# Patient Record
Sex: FEMALE | Race: BLACK OR AFRICAN AMERICAN | Employment: UNEMPLOYED | URBAN - METROPOLITAN AREA
[De-identification: names, ages, dates, MRNs, and addresses within clinical notes are randomized per-mention and may not be internally consistent; named-entity substitution may affect disease eponyms.]

---

## 2018-08-30 ASSESSMENT — PAIN DESCRIPTION - PAIN TYPE: TYPE: CHRONIC PAIN

## 2018-08-30 ASSESSMENT — PAIN DESCRIPTION - DESCRIPTORS: DESCRIPTORS: CONSTANT

## 2018-08-30 ASSESSMENT — PAIN SCALES - GENERAL: PAINLEVEL_OUTOF10: 10

## 2018-08-30 ASSESSMENT — PAIN DESCRIPTION - LOCATION: LOCATION: BACK

## 2018-08-31 ENCOUNTER — APPOINTMENT (OUTPATIENT)
Dept: GENERAL RADIOLOGY | Age: 63
End: 2018-08-31
Payer: COMMERCIAL

## 2018-08-31 ENCOUNTER — HOSPITAL ENCOUNTER (EMERGENCY)
Age: 63
Discharge: HOME | End: 2018-08-31
Attending: EMERGENCY MEDICINE
Payer: COMMERCIAL

## 2018-08-31 VITALS
WEIGHT: 220 LBS | SYSTOLIC BLOOD PRESSURE: 154 MMHG | OXYGEN SATURATION: 98 % | BODY MASS INDEX: 34.53 KG/M2 | DIASTOLIC BLOOD PRESSURE: 81 MMHG | HEART RATE: 74 BPM | TEMPERATURE: 98.6 F | HEIGHT: 67 IN | RESPIRATION RATE: 18 BRPM

## 2018-08-31 DIAGNOSIS — K59.00 CONSTIPATION, UNSPECIFIED CONSTIPATION TYPE: Primary | ICD-10-CM

## 2018-08-31 LAB
ALBUMIN SERPL-MCNC: 4.1 G/DL (ref 3.5–5.2)
ALP BLD-CCNC: 65 U/L (ref 35–104)
ALT SERPL-CCNC: 15 U/L (ref 0–32)
ANION GAP SERPL CALCULATED.3IONS-SCNC: 11 MMOL/L (ref 7–16)
AST SERPL-CCNC: 17 U/L (ref 0–31)
BASOPHILS ABSOLUTE: 0.04 E9/L (ref 0–0.2)
BASOPHILS RELATIVE PERCENT: 0.5 % (ref 0–2)
BILIRUB SERPL-MCNC: 0.4 MG/DL (ref 0–1.2)
BILIRUBIN URINE: NEGATIVE
BLOOD, URINE: NEGATIVE
BUN BLDV-MCNC: 9 MG/DL (ref 8–23)
CALCIUM SERPL-MCNC: 9.7 MG/DL (ref 8.6–10.2)
CHLORIDE BLD-SCNC: 103 MMOL/L (ref 98–107)
CLARITY: CLEAR
CO2: 27 MMOL/L (ref 22–29)
COLOR: YELLOW
CREAT SERPL-MCNC: 0.7 MG/DL (ref 0.5–1)
EOSINOPHILS ABSOLUTE: 0.4 E9/L (ref 0.05–0.5)
EOSINOPHILS RELATIVE PERCENT: 5.3 % (ref 0–6)
GFR AFRICAN AMERICAN: >60
GFR NON-AFRICAN AMERICAN: >60 ML/MIN/1.73
GLUCOSE BLD-MCNC: 82 MG/DL (ref 74–109)
GLUCOSE URINE: NEGATIVE MG/DL
HCT VFR BLD CALC: 34.5 % (ref 34–48)
HEMOGLOBIN: 11.1 G/DL (ref 11.5–15.5)
IMMATURE GRANULOCYTES #: 0.01 E9/L
IMMATURE GRANULOCYTES %: 0.1 % (ref 0–5)
KETONES, URINE: NEGATIVE MG/DL
LACTIC ACID: 1 MMOL/L (ref 0.5–2.2)
LEUKOCYTE ESTERASE, URINE: NEGATIVE
LIPASE: 28 U/L (ref 13–60)
LYMPHOCYTES ABSOLUTE: 2.34 E9/L (ref 1.5–4)
LYMPHOCYTES RELATIVE PERCENT: 31.2 % (ref 20–42)
MCH RBC QN AUTO: 28.5 PG (ref 26–35)
MCHC RBC AUTO-ENTMCNC: 32.2 % (ref 32–34.5)
MCV RBC AUTO: 88.5 FL (ref 80–99.9)
MONOCYTES ABSOLUTE: 0.61 E9/L (ref 0.1–0.95)
MONOCYTES RELATIVE PERCENT: 8.1 % (ref 2–12)
NEUTROPHILS ABSOLUTE: 4.09 E9/L (ref 1.8–7.3)
NEUTROPHILS RELATIVE PERCENT: 54.8 % (ref 43–80)
NITRITE, URINE: NEGATIVE
PDW BLD-RTO: 14.4 FL (ref 11.5–15)
PH UA: 6 (ref 5–9)
PLATELET # BLD: 231 E9/L (ref 130–450)
PMV BLD AUTO: 9.9 FL (ref 7–12)
POTASSIUM SERPL-SCNC: 3.5 MMOL/L (ref 3.5–5)
PROTEIN UA: NEGATIVE MG/DL
RBC # BLD: 3.9 E12/L (ref 3.5–5.5)
SODIUM BLD-SCNC: 141 MMOL/L (ref 132–146)
SPECIFIC GRAVITY UA: 1.01 (ref 1–1.03)
TOTAL PROTEIN: 7.4 G/DL (ref 6.4–8.3)
UROBILINOGEN, URINE: 0.2 E.U./DL
WBC # BLD: 7.5 E9/L (ref 4.5–11.5)

## 2018-08-31 PROCEDURE — 85025 COMPLETE CBC W/AUTO DIFF WBC: CPT

## 2018-08-31 PROCEDURE — 99284 EMERGENCY DEPT VISIT MOD MDM: CPT

## 2018-08-31 PROCEDURE — 96374 THER/PROPH/DIAG INJ IV PUSH: CPT

## 2018-08-31 PROCEDURE — 2580000003 HC RX 258: Performed by: EMERGENCY MEDICINE

## 2018-08-31 PROCEDURE — 81003 URINALYSIS AUTO W/O SCOPE: CPT

## 2018-08-31 PROCEDURE — 80053 COMPREHEN METABOLIC PANEL: CPT

## 2018-08-31 PROCEDURE — 36415 COLL VENOUS BLD VENIPUNCTURE: CPT

## 2018-08-31 PROCEDURE — 83690 ASSAY OF LIPASE: CPT

## 2018-08-31 PROCEDURE — 74018 RADEX ABDOMEN 1 VIEW: CPT

## 2018-08-31 PROCEDURE — 83605 ASSAY OF LACTIC ACID: CPT

## 2018-08-31 PROCEDURE — 6360000002 HC RX W HCPCS: Performed by: EMERGENCY MEDICINE

## 2018-08-31 PROCEDURE — 96375 TX/PRO/DX INJ NEW DRUG ADDON: CPT

## 2018-08-31 RX ORDER — 0.9 % SODIUM CHLORIDE 0.9 %
500 INTRAVENOUS SOLUTION INTRAVENOUS ONCE
Status: COMPLETED | OUTPATIENT
Start: 2018-08-31 | End: 2018-08-31

## 2018-08-31 RX ORDER — POLYETHYLENE GLYCOL 3350 17 G/17G
17 POWDER ORAL 2 TIMES DAILY
Qty: 1 BOTTLE | Refills: 1 | Status: SHIPPED | OUTPATIENT
Start: 2018-08-31 | End: 2018-11-08

## 2018-08-31 RX ORDER — ONDANSETRON 2 MG/ML
4 INJECTION INTRAMUSCULAR; INTRAVENOUS ONCE
Status: COMPLETED | OUTPATIENT
Start: 2018-08-31 | End: 2018-08-31

## 2018-08-31 RX ORDER — KETOROLAC TROMETHAMINE 30 MG/ML
15 INJECTION, SOLUTION INTRAMUSCULAR; INTRAVENOUS ONCE
Status: COMPLETED | OUTPATIENT
Start: 2018-08-31 | End: 2018-08-31

## 2018-08-31 RX ADMIN — KETOROLAC TROMETHAMINE 15 MG: 30 INJECTION INTRAMUSCULAR; INTRAVENOUS at 02:44

## 2018-08-31 RX ADMIN — ONDANSETRON 4 MG: 2 INJECTION INTRAMUSCULAR; INTRAVENOUS at 02:43

## 2018-08-31 RX ADMIN — SODIUM CHLORIDE 500 ML: 9 INJECTION, SOLUTION INTRAVENOUS at 02:38

## 2018-08-31 ASSESSMENT — PAIN SCALES - GENERAL
PAINLEVEL_OUTOF10: 3
PAINLEVEL_OUTOF10: 10

## 2018-08-31 NOTE — ED PROVIDER NOTES
Department of Emergency Medicine   ED  Provider Note  Admit Date/RoomTime: 8/31/2018  1:00 AM  ED Room: 09/09      History of Present Illness:  8/31/18, Time: 2:01 AM         Kaley Pulido is a 58 y.o. female presenting to the ED for back pain, beginning one day ago. The complaint has been constant, moderate in severity, and worsened by walking. Pt states that she has back pain that wraps around to the right side of her ribs. Pt states that she has been constipated. Pt reports that her last BM was 5 days ago. Pt denies cough, congestion, fever, chills, N/V/D, HA, CP, SOB, abdominal pain, neck pain, LOC, syncope, urinary symptoms, or any other symptoms. Review of Systems:   Pertinent positives and negatives are stated within HPI, all other systems reviewed and are negative.    --------------------------------------------- PAST HISTORY ---------------------------------------------  Past Medical History:  has no past medical history on file. Past Surgical History:  has a past surgical history that includes back surgery. Social History:  reports that she has quit smoking. She has never used smokeless tobacco. She reports that she does not drink alcohol or use drugs. Family History: family history is not on file. The patients home medications have been reviewed. Allergies: Vicodin [hydrocodone-acetaminophen]      ---------------------------------------------------PHYSICAL EXAM--------------------------------------    Constitutional/General: Alert and oriented x3, well appearing, non toxic in NAD  Head: Normocephalic and atraumatic  Eyes: PERRL, EOMI. Mouth: Oropharynx clear, mucous membranes moist.   Neck: Supple. Full ROM. Respiratory: Lungs clear to auscultation bilaterally, no wheezes, rales, or rhonchi. Not in respiratory distress   Cardiovascular:  Regular rate. Regular rhythm. No murmurs, gallops, or rubs.  2+ distal pulses  GI:  Abdomen Soft, right flank tenderness, no

## 2018-11-08 ENCOUNTER — HOSPITAL ENCOUNTER (EMERGENCY)
Age: 63
Discharge: HOME | End: 2018-11-08
Attending: EMERGENCY MEDICINE
Payer: COMMERCIAL

## 2018-11-08 ENCOUNTER — APPOINTMENT (OUTPATIENT)
Dept: CT IMAGING | Age: 63
End: 2018-11-08
Payer: COMMERCIAL

## 2018-11-08 VITALS
DIASTOLIC BLOOD PRESSURE: 81 MMHG | RESPIRATION RATE: 18 BRPM | WEIGHT: 200 LBS | OXYGEN SATURATION: 99 % | TEMPERATURE: 98.5 F | SYSTOLIC BLOOD PRESSURE: 151 MMHG | HEIGHT: 67 IN | BODY MASS INDEX: 31.39 KG/M2 | HEART RATE: 66 BPM

## 2018-11-08 DIAGNOSIS — R51.9 ACUTE NONINTRACTABLE HEADACHE, UNSPECIFIED HEADACHE TYPE: Primary | ICD-10-CM

## 2018-11-08 DIAGNOSIS — R42 DIZZINESS: ICD-10-CM

## 2018-11-08 LAB
EKG ATRIAL RATE: 63 BPM
EKG P AXIS: 31 DEGREES
EKG P-R INTERVAL: 188 MS
EKG Q-T INTERVAL: 380 MS
EKG QRS DURATION: 72 MS
EKG QTC CALCULATION (BAZETT): 388 MS
EKG R AXIS: 51 DEGREES
EKG T AXIS: -12 DEGREES
EKG VENTRICULAR RATE: 63 BPM

## 2018-11-08 PROCEDURE — 99284 EMERGENCY DEPT VISIT MOD MDM: CPT

## 2018-11-08 PROCEDURE — 6370000000 HC RX 637 (ALT 250 FOR IP): Performed by: PHYSICIAN ASSISTANT

## 2018-11-08 PROCEDURE — 70450 CT HEAD/BRAIN W/O DYE: CPT

## 2018-11-08 RX ORDER — IBUPROFEN 600 MG/1
600 TABLET ORAL ONCE
Status: DISCONTINUED | OUTPATIENT
Start: 2018-11-08 | End: 2018-11-08 | Stop reason: HOSPADM

## 2018-11-08 RX ORDER — ACETAMINOPHEN 325 MG/1
650 TABLET ORAL ONCE
Status: COMPLETED | OUTPATIENT
Start: 2018-11-08 | End: 2018-11-08

## 2018-11-08 RX ADMIN — ACETAMINOPHEN 650 MG: 325 TABLET ORAL at 13:21

## 2018-11-08 ASSESSMENT — PAIN SCALES - GENERAL
PAINLEVEL_OUTOF10: 9
PAINLEVEL_OUTOF10: 6

## 2018-11-08 ASSESSMENT — PAIN DESCRIPTION - ORIENTATION: ORIENTATION: RIGHT

## 2018-11-08 ASSESSMENT — PAIN DESCRIPTION - LOCATION: LOCATION: HEAD

## 2018-11-08 ASSESSMENT — PAIN DESCRIPTION - PAIN TYPE: TYPE: ACUTE PAIN

## 2018-11-08 NOTE — ED PROVIDER NOTES
ED Attending  CC: Kimberley       Department of Emergency Medicine   ED  Provider Note  Admit Date/RoomTime: 11/8/2018 12:11 PM  ED Room: [facility]/[place] [address]   Chief Complaint:   Headache (reports woke up not feeling well with headache and dizzyness) and Dizziness    History of Present Illness   Source of history provided by:  patient. History/Exam Limitations: none. Nba Jean Baptiste is a 58 y.o. old female who has a past medical hx of: History reviewed. No pertinent past medical history. presents to the emergency department by private vehicle, for complaint of gradual onset right-sided head pain which began this morning shortly after waking up prior. Patient states she got out of bed and when she went to stand up she felt lightheaded, \"like something was about to happen\". She states she walked to the bathroom and she still was feeling lightheaded. She states that the lightheadedness lasted about 20 minutes and now has subsided. Shortly after, she started with a dull right-sided headache located at the top of her head and behind her right orbit. She states \"it's not really a headache, just a pain\". Since onset the symptoms have been getting better and mild in severity. She denies recent illness, sinus congestion, chest pain, SOB, fevers, chills. Pt states she does not see a PCP and states she takes her BP at home and it usually runs around \"the 120/80 range\". She does not take anything for HTN. The patient has history of nothing pertinent. Today's episode is not typical for her. She has had no prior workup in the past. The pain is associated with nothing else and negative for numbness, weakness, speech or visual changes. The symptoms are aggravated by nothing and relieved by putting pressure on the head. There has been no history of recent traumA. Treatment prior to arrival consisted of: nothing. She has a history of no anticoagulation use.     ROS    Pertinent positives and negatives are stated within HPI, all

## 2019-02-28 ENCOUNTER — HOSPITAL ENCOUNTER (EMERGENCY)
Age: 64
Discharge: HOME | End: 2019-02-28
Attending: EMERGENCY MEDICINE
Payer: COMMERCIAL

## 2019-02-28 ENCOUNTER — APPOINTMENT (OUTPATIENT)
Dept: GENERAL RADIOLOGY | Age: 64
End: 2019-02-28
Payer: COMMERCIAL

## 2019-02-28 VITALS
OXYGEN SATURATION: 100 % | HEIGHT: 67 IN | WEIGHT: 200 LBS | DIASTOLIC BLOOD PRESSURE: 88 MMHG | TEMPERATURE: 98 F | BODY MASS INDEX: 31.39 KG/M2 | SYSTOLIC BLOOD PRESSURE: 148 MMHG | HEART RATE: 94 BPM | RESPIRATION RATE: 16 BRPM

## 2019-02-28 DIAGNOSIS — R05.9 COUGH: Primary | ICD-10-CM

## 2019-02-28 DIAGNOSIS — J98.01 BRONCHOSPASM: ICD-10-CM

## 2019-02-28 LAB
ALBUMIN SERPL-MCNC: 4.4 G/DL (ref 3.5–5.2)
ALP BLD-CCNC: 82 U/L (ref 35–104)
ALT SERPL-CCNC: 15 U/L (ref 0–32)
ANION GAP SERPL CALCULATED.3IONS-SCNC: 7 MMOL/L (ref 7–16)
AST SERPL-CCNC: 19 U/L (ref 0–31)
BASOPHILS ABSOLUTE: 0.05 E9/L (ref 0–0.2)
BASOPHILS RELATIVE PERCENT: 0.7 % (ref 0–2)
BILIRUB SERPL-MCNC: <0.2 MG/DL (ref 0–1.2)
BUN BLDV-MCNC: 14 MG/DL (ref 8–23)
CALCIUM SERPL-MCNC: 9.6 MG/DL (ref 8.6–10.2)
CHLORIDE BLD-SCNC: 98 MMOL/L (ref 98–107)
CO2: 29 MMOL/L (ref 22–29)
CREAT SERPL-MCNC: 0.9 MG/DL (ref 0.5–1)
D DIMER: <200 NG/ML DDU
EOSINOPHILS ABSOLUTE: 0.44 E9/L (ref 0.05–0.5)
EOSINOPHILS RELATIVE PERCENT: 6.2 % (ref 0–6)
GFR AFRICAN AMERICAN: >60
GFR NON-AFRICAN AMERICAN: >60 ML/MIN/1.73
GLUCOSE BLD-MCNC: 143 MG/DL (ref 74–99)
HCT VFR BLD CALC: 37.5 % (ref 34–48)
HEMOGLOBIN: 12.2 G/DL (ref 11.5–15.5)
IMMATURE GRANULOCYTES #: 0.02 E9/L
IMMATURE GRANULOCYTES %: 0.3 % (ref 0–5)
LYMPHOCYTES ABSOLUTE: 1.95 E9/L (ref 1.5–4)
LYMPHOCYTES RELATIVE PERCENT: 27.5 % (ref 20–42)
MCH RBC QN AUTO: 28.7 PG (ref 26–35)
MCHC RBC AUTO-ENTMCNC: 32.5 % (ref 32–34.5)
MCV RBC AUTO: 88.2 FL (ref 80–99.9)
MONOCYTES ABSOLUTE: 0.53 E9/L (ref 0.1–0.95)
MONOCYTES RELATIVE PERCENT: 7.5 % (ref 2–12)
NEUTROPHILS ABSOLUTE: 4.1 E9/L (ref 1.8–7.3)
NEUTROPHILS RELATIVE PERCENT: 57.8 % (ref 43–80)
PDW BLD-RTO: 14.5 FL (ref 11.5–15)
PLATELET # BLD: 283 E9/L (ref 130–450)
PMV BLD AUTO: 10.6 FL (ref 7–12)
POTASSIUM SERPL-SCNC: 3.8 MMOL/L (ref 3.5–5)
PRO-BNP: 25 PG/ML (ref 0–125)
RBC # BLD: 4.25 E12/L (ref 3.5–5.5)
SODIUM BLD-SCNC: 134 MMOL/L (ref 132–146)
TOTAL PROTEIN: 7.5 G/DL (ref 6.4–8.3)
TROPONIN: <0.01 NG/ML (ref 0–0.03)
WBC # BLD: 7.1 E9/L (ref 4.5–11.5)

## 2019-02-28 PROCEDURE — 83880 ASSAY OF NATRIURETIC PEPTIDE: CPT

## 2019-02-28 PROCEDURE — 94640 AIRWAY INHALATION TREATMENT: CPT

## 2019-02-28 PROCEDURE — 80053 COMPREHEN METABOLIC PANEL: CPT

## 2019-02-28 PROCEDURE — 36415 COLL VENOUS BLD VENIPUNCTURE: CPT

## 2019-02-28 PROCEDURE — 84484 ASSAY OF TROPONIN QUANT: CPT

## 2019-02-28 PROCEDURE — 94760 N-INVAS EAR/PLS OXIMETRY 1: CPT

## 2019-02-28 PROCEDURE — 99285 EMERGENCY DEPT VISIT HI MDM: CPT

## 2019-02-28 PROCEDURE — 93005 ELECTROCARDIOGRAM TRACING: CPT | Performed by: NURSE PRACTITIONER

## 2019-02-28 PROCEDURE — 85378 FIBRIN DEGRADE SEMIQUANT: CPT

## 2019-02-28 PROCEDURE — 94664 DEMO&/EVAL PT USE INHALER: CPT

## 2019-02-28 PROCEDURE — 6370000000 HC RX 637 (ALT 250 FOR IP): Performed by: EMERGENCY MEDICINE

## 2019-02-28 PROCEDURE — 85025 COMPLETE CBC W/AUTO DIFF WBC: CPT

## 2019-02-28 PROCEDURE — 71045 X-RAY EXAM CHEST 1 VIEW: CPT

## 2019-02-28 RX ORDER — PREDNISONE 20 MG/1
60 TABLET ORAL ONCE
Status: COMPLETED | OUTPATIENT
Start: 2019-02-28 | End: 2019-02-28

## 2019-02-28 RX ORDER — METHYLPREDNISOLONE 4 MG/1
TABLET ORAL
Qty: 21 TABLET | Refills: 0 | Status: SHIPPED | OUTPATIENT
Start: 2019-02-28 | End: 2019-03-06

## 2019-02-28 RX ORDER — ALBUTEROL SULFATE 90 UG/1
2 AEROSOL, METERED RESPIRATORY (INHALATION) EVERY 4 HOURS PRN
Qty: 1 INHALER | Refills: 1 | Status: SHIPPED | OUTPATIENT
Start: 2019-02-28 | End: 2019-05-24 | Stop reason: SDUPTHER

## 2019-02-28 RX ORDER — IPRATROPIUM BROMIDE AND ALBUTEROL SULFATE 2.5; .5 MG/3ML; MG/3ML
1 SOLUTION RESPIRATORY (INHALATION)
Status: COMPLETED | OUTPATIENT
Start: 2019-02-28 | End: 2019-02-28

## 2019-02-28 RX ADMIN — IPRATROPIUM BROMIDE AND ALBUTEROL SULFATE 1 AMPULE: .5; 3 SOLUTION RESPIRATORY (INHALATION) at 20:32

## 2019-02-28 RX ADMIN — IPRATROPIUM BROMIDE AND ALBUTEROL SULFATE 1 AMPULE: .5; 3 SOLUTION RESPIRATORY (INHALATION) at 20:29

## 2019-02-28 RX ADMIN — PREDNISONE 60 MG: 20 TABLET ORAL at 20:19

## 2019-02-28 RX ADMIN — IPRATROPIUM BROMIDE AND ALBUTEROL SULFATE 1 AMPULE: .5; 3 SOLUTION RESPIRATORY (INHALATION) at 20:33

## 2019-03-01 LAB
EKG ATRIAL RATE: 107 BPM
EKG P AXIS: 60 DEGREES
EKG P-R INTERVAL: 188 MS
EKG Q-T INTERVAL: 322 MS
EKG QRS DURATION: 72 MS
EKG QTC CALCULATION (BAZETT): 429 MS
EKG R AXIS: 64 DEGREES
EKG T AXIS: 57 DEGREES
EKG VENTRICULAR RATE: 107 BPM

## 2019-03-11 ENCOUNTER — OFFICE VISIT (OUTPATIENT)
Dept: INTERNAL MEDICINE | Age: 64
End: 2019-03-11
Payer: COMMERCIAL

## 2019-03-11 VITALS
HEIGHT: 67 IN | HEART RATE: 87 BPM | SYSTOLIC BLOOD PRESSURE: 149 MMHG | DIASTOLIC BLOOD PRESSURE: 72 MMHG | RESPIRATION RATE: 16 BRPM | TEMPERATURE: 99.3 F | WEIGHT: 220.1 LBS | BODY MASS INDEX: 34.55 KG/M2

## 2019-03-11 DIAGNOSIS — B34.9 VIRAL ILLNESS: ICD-10-CM

## 2019-03-11 PROCEDURE — 90686 IIV4 VACC NO PRSV 0.5 ML IM: CPT

## 2019-03-11 PROCEDURE — G0008 ADMIN INFLUENZA VIRUS VAC: HCPCS

## 2019-03-11 PROCEDURE — 99212 OFFICE O/P EST SF 10 MIN: CPT | Performed by: INTERNAL MEDICINE

## 2019-03-11 PROCEDURE — 99213 OFFICE O/P EST LOW 20 MIN: CPT | Performed by: INTERNAL MEDICINE

## 2019-03-11 PROCEDURE — 6360000002 HC RX W HCPCS

## 2019-03-11 RX ORDER — ALBUTEROL SULFATE 90 UG/1
2 AEROSOL, METERED RESPIRATORY (INHALATION) EVERY 6 HOURS PRN
Qty: 1 INHALER | Refills: 3 | Status: SHIPPED | OUTPATIENT
Start: 2019-03-11 | End: 2019-07-22 | Stop reason: SDUPTHER

## 2019-03-11 RX ORDER — GUAIFENESIN 600 MG/1
600 TABLET, EXTENDED RELEASE ORAL 2 TIMES DAILY
Qty: 30 TABLET | Refills: 0 | Status: SHIPPED | OUTPATIENT
Start: 2019-03-11 | End: 2019-03-26

## 2019-03-11 RX ORDER — BENZONATATE 100 MG/1
100-200 CAPSULE ORAL 3 TIMES DAILY PRN
Qty: 60 CAPSULE | Refills: 0 | Status: SHIPPED | OUTPATIENT
Start: 2019-03-11 | End: 2019-03-18

## 2019-03-11 ASSESSMENT — PATIENT HEALTH QUESTIONNAIRE - PHQ9
SUM OF ALL RESPONSES TO PHQ QUESTIONS 1-9: 0
2. FEELING DOWN, DEPRESSED OR HOPELESS: 0
1. LITTLE INTEREST OR PLEASURE IN DOING THINGS: 0
SUM OF ALL RESPONSES TO PHQ QUESTIONS 1-9: 0
SUM OF ALL RESPONSES TO PHQ9 QUESTIONS 1 & 2: 0

## 2019-03-11 ASSESSMENT — ENCOUNTER SYMPTOMS
NAUSEA: 0
SINUS PAIN: 0
COUGH: 1
WHEEZING: 0
DIARRHEA: 0
SHORTNESS OF BREATH: 0
ABDOMINAL PAIN: 0
CONSTIPATION: 0
VOMITING: 0

## 2019-05-24 ENCOUNTER — OFFICE VISIT (OUTPATIENT)
Dept: INTERNAL MEDICINE | Age: 64
End: 2019-05-24
Payer: COMMERCIAL

## 2019-05-24 VITALS
WEIGHT: 216 LBS | HEIGHT: 68 IN | BODY MASS INDEX: 32.74 KG/M2 | SYSTOLIC BLOOD PRESSURE: 136 MMHG | RESPIRATION RATE: 16 BRPM | DIASTOLIC BLOOD PRESSURE: 84 MMHG | TEMPERATURE: 98.5 F | HEART RATE: 90 BPM

## 2019-05-24 DIAGNOSIS — R05.3 CHRONIC COUGH: Primary | ICD-10-CM

## 2019-05-24 PROCEDURE — 99213 OFFICE O/P EST LOW 20 MIN: CPT | Performed by: INTERNAL MEDICINE

## 2019-05-24 PROCEDURE — 99212 OFFICE O/P EST SF 10 MIN: CPT | Performed by: INTERNAL MEDICINE

## 2019-05-24 RX ORDER — BUDESONIDE AND FORMOTEROL FUMARATE DIHYDRATE 160; 4.5 UG/1; UG/1
2 AEROSOL RESPIRATORY (INHALATION) 2 TIMES DAILY
Qty: 1 INHALER | Refills: 5 | Status: SHIPPED | OUTPATIENT
Start: 2019-05-24 | End: 2019-07-22 | Stop reason: SDUPTHER

## 2019-05-24 RX ORDER — PANTOPRAZOLE SODIUM 40 MG/1
40 TABLET, DELAYED RELEASE ORAL
Qty: 180 TABLET | Refills: 1 | Status: SHIPPED | OUTPATIENT
Start: 2019-05-24

## 2019-05-24 ASSESSMENT — ENCOUNTER SYMPTOMS
VOMITING: 0
CONSTIPATION: 0
ABDOMINAL PAIN: 0
SHORTNESS OF BREATH: 0
NAUSEA: 0
DIARRHEA: 0
WHEEZING: 0
COUGH: 0
SINUS PAIN: 0

## 2019-05-24 NOTE — PROGRESS NOTES
[facility]  [facility]    Attending Physician Statement:  Lizzeth Gonzalez M.D., F.A.C.P. I have  discussed the case, including pertinent history and exam findings with the resident. I agree with the assessment, plan and orders as documented by the resident. Patient is seen for fu visit today. Last office notes reviewed, relative labs and imaging. Health maintenance issues of vaccinations, depression screening, tobacco cessation etc... covered  Chronic nocturnal cough  No chf symptoms   No PND complaints  -- no hx of asthma/copd-- but we trialed albuterol is helpful but not for cough  (cough variant possible-- vs eosinophilic non allergic bronchitis)  -- will now trial LABA and steroid inhaler- (trial one month) +/- pfts    -- possible gerd PPI bid 2-3months to see helpful  nsaids make cough worse at gerd, GERD vs asthma  - fu in 6 weeks and reassess  Remainder of medical problems as per resident note.

## 2019-05-24 NOTE — PROGRESS NOTES
[facility]  InternalMedicine Residency Program  ACC Note      SUBJECTIVE:  CC: had concerns including Chest Congestion and Cough (clear thick sputum). Segundo Mack presented to the [facility] for a routine visit to discuss     Review of Systems   Constitutional: Negative for chills and fever. HENT: Negative for congestion and sinus pain. Respiratory: Negative for cough, shortness of breath and wheezing. Cardiovascular: Negative for chest pain, palpitations and leg swelling. Gastrointestinal: Negative for abdominal pain, constipation, diarrhea, nausea and vomiting. Genitourinary: Negative for dysuria and frequency. Musculoskeletal: Negative for myalgias. Skin: Negative for rash. Allergic/Immunologic: Negative for environmental allergies. Neurological: Negative for headaches. Hematological: Does not bruise/bleed easily. Psychiatric/Behavioral: Negative for suicidal ideas. Outpatient Medications Marked as Taking for the 5/24/19 encounter (Office Visit) with Myrene Filter., DO   Medication Sig Dispense Refill    budesonide-formoterol (SYMBICORT) 160-4.5 MCG/ACT AERO Inhale 2 puffs into the lungs 2 times daily 1 Inhaler 5    pantoprazole (PROTONIX) 40 MG tablet Take 1 tablet by mouth 2 times daily (before meals) 180 tablet 1       I have reviewed all pertinent PMHx, PSHx, FamHx, SocialHx, medications, and allergiesand updated history as appropriate. OBJECTIVE:    VS: /84 (Site: Right Upper Arm, Position: Sitting, Cuff Size: Large Adult)   Pulse 90   Temp 98.5 °F (36.9 °C) (Oral)   Resp 16   Ht 5' 8\" (1.727 m)   Wt 216 lb (98 kg)   BMI 32.84 kg/m²   Physical Exam   Constitutional: She appears well-developed and well-nourished. HENT:   Head: Normocephalic and atraumatic. Eyes: Pupils are equal, round, and reactive to light. Conjunctivae and EOM are normal. No scleral icterus. Neck: Carotid bruit is not present.    Cardiovascular: Normal rate, regular rhythm, S1 normal, S2 normal and normal heart sounds. PMI is not displaced. No murmur heard. Pulses:       Posterior tibial pulses are 2+ on the right side, and 2+ on the left side. No Edema in BL LE  No JVD; no hepatojugular reflex   Pulmonary/Chest: Effort normal. No respiratory distress. She has no decreased breath sounds. She has no wheezes. She has no rhonchi. She has no rales. Abdominal: Soft. Bowel sounds are normal. She exhibits no mass. There is no tenderness. There is no guarding. Neurological: She is alert. Nursing note and vitals reviewed.       PLAN:    Chronic Cough  -productive; whitish clear sputum; discussed triggers; patient relates that notices that the cough gets worse when there   -PFTs; albuterol, symbicort to rule out cough variant asthma vs ASA/COXI induced asthma   -Protonix trial BID to R/O GERD 2/2 symptoms occurring at night when lying in bed more often   -No physcial examination findings of Heart Failure; if cough does not resolve consider TTE    HCM 50-64 woman  - will discuss HCM at future exam    Health Maintenance Due   Topic Date Due    Hepatitis C screen  1955    HIV screen  11/13/1970    DTaP/Tdap/Td vaccine (1 - Tdap) 11/13/1974    Cervical cancer screen  11/13/1976    Lipid screen  11/13/1995    Diabetes screen  11/13/1995    Breast cancer screen  11/13/2005    Shingles Vaccine (1 of 2) 11/13/2005    Colon cancer screen colonoscopy  11/13/2005       I have reviewed my findings and recommendations with Divina Neff and Dr Mi Fernando, DO PGY-1   5/24/2019 3:07 PM

## 2019-05-24 NOTE — PATIENT INSTRUCTIONS
Patient Education        Preventing Falls: Care Instructions  Your Care Instructions    Getting around your home safely can be a challenge if you have injuries or health problems that make it easy for you to fall. Loose rugs and furniture in walkways are among the dangers for many older people who have problems walking or who have poor eyesight. People who have conditions such as arthritis, osteoporosis, or dementia also have to be careful not to fall. You can make your home safer with a few simple measures. Follow-up care is a key part of your treatment and safety. Be sure to make and go to all appointments, and call your doctor if you are having problems. It's also a good idea to know your test results and keep a list of the medicines you take. How can you care for yourself at home? Taking care of yourself  · You may get dizzy if you do not drink enough water. To prevent dehydration, drink plenty of fluids, enough so that your urine is light yellow or clear like water. Choose water and other caffeine-free clear liquids. If you have kidney, heart, or liver disease and have to limit fluids, talk with your doctor before you increase the amount of fluids you drink. · Exercise regularly to improve your strength, muscle tone, and balance. Walk if you can. Swimming may be a good choice if you cannot walk easily. · Have your vision and hearing checked each year or any time you notice a change. If you have trouble seeing and hearing, you might not be able to avoid objects and could lose your balance. · Know the side effects of the medicines you take. Ask your doctor or pharmacist whether the medicines you take can affect your balance. Sleeping pills or sedatives can affect your balance. · Limit the amount of alcohol you drink. Alcohol can impair your balance and other senses. · Ask your doctor whether calluses or corns on your feet need to be removed.  If you wear loose-fitting shoes because of calluses or corns, you can lose your balance and fall. · Talk to your doctor if you have numbness in your feet. Preventing falls at home  · Remove raised doorway thresholds, throw rugs, and clutter. Repair loose carpet or raised areas in the floor. · Move furniture and electrical cords to keep them out of walking paths. · Use nonskid floor wax, and wipe up spills right away, especially on ceramic tile floors. · If you use a walker or cane, put rubber tips on it. If you use crutches, clean the bottoms of them regularly with an abrasive pad, such as steel wool. · Keep your house well lit, especially Sophia Session, and outside walkways. Use night-lights in areas such as hallways and bathrooms. Add extra light switches or use remote switches (such as switches that go on or off when you clap your hands) to make it easier to turn lights on if you have to get up during the night. · Install sturdy handrails on stairways. · Move items in your cabinets so that the things you use a lot are on the lower shelves (about waist level). · Keep a cordless phone and a flashlight with new batteries by your bed. If possible, put a phone in each of the main rooms of your house, or carry a cell phone in case you fall and cannot reach a phone. Or, you can wear a device around your neck or wrist. You push a button that sends a signal for help. · Wear low-heeled shoes that fit well and give your feet good support. Use footwear with nonskid soles. Check the heels and soles of your shoes for wear. Repair or replace worn heels or soles. · Do not wear socks without shoes on wood floors. · Walk on the grass when the sidewalks are slippery. If you live in an area that gets snow and ice in the winter, sprinkle salt on slippery steps and sidewalks. Preventing falls in the bath  · Install grab bars and nonskid mats inside and outside your shower or tub and near the toilet and sinks. · Use shower chairs and bath benches.   · Use a hand-held shower head to all appointments, and call your doctor if you are having problems. It's also a good idea to know your test results and keep a list of the medicines you take. How can you care for yourself at home? · Drink plenty of water and other fluids. This may help soothe a dry or sore throat. Honey or lemon juice in hot water or tea may ease a dry cough. · Prop up your head on pillows to help you breathe and ease a cough. · Do not smoke or allow others to smoke around you. Smoke can make a cough worse. If you need help quitting, talk to your doctor about stop-smoking programs and medicines. These can increase your chances of quitting for good. · Avoid exposure to smoke, dust, or other pollutants, or wear a face mask. Check with your doctor or pharmacist to find out which type of face mask will give you the most benefit. · Take cough medicine as directed by your doctor. · Try cough drops to soothe a dry or sore throat. Cough drops don't stop a cough. Medicine-flavored cough drops are no better than candy-flavored drops or hard candy. Throat clearing  When you have a chronic cough or a disease that may cause this type of cough, you may often feel like you want to clear your throat. This helps bring up mucus. But throat clearing does not always have a cause. Throat clearing can become a habit. The more you do it, the more you feel like you need to do it. But frequent throat clearing can be hard on your vocal cords. It's like slamming them together. To help lessen throat clearing, you can try:  · Taking small sips of water. · Not clearing your throat when you feel you need to. · Swallowing hard when you want to clear your throat. You may want to ask your doctor if a medicine that thins mucus would help. When should you call for help? Call 911 anytime you think you may need emergency care.  For example, call if:    · You have severe trouble breathing.    Call your doctor now or seek immediate medical care if:    · You cough up blood.     · You have new or worse trouble breathing.     · You have a new or higher fever.    Watch closely for changes in your health, and be sure to contact your doctor if:    · You cough more deeply or more often, especially if you notice more mucus or a change in the color of your mucus.     · You do not get better as expected. Where can you learn more? Go to https://[employer].Pixifly. org and sign in to your [employer] account. Enter E248 in the [employer] box to learn more about \"Chronic Cough: Care Instructions. \"     If you do not have an account, please click on the \"Sign Up Now\" link. Current as of: September 5, 2018  Content Version: 12.0  © 1301-8058 [employer]. Care instructions adapted under license by [facility] ([facility]). If you have questions about a medical condition or this instruction, always ask your healthcare professional. [place] [address] any warranty or liability for your use of this information. Lab Tests to get prior to next Visit  1. Pulmonary Function Testing     Changes in Medications  1. Continue Albuterol as needed for SOB and cough  2. Start Symbicort daily as prescribed   3. Start Protonix twice per day     Referrals   1. None     Please follow up 6 weeks with our clinic. Please call if your symptoms worsen or fail to improve.

## 2019-05-28 ENCOUNTER — TELEPHONE (OUTPATIENT)
Dept: INTERNAL MEDICINE | Age: 64
End: 2019-05-28

## 2019-05-28 NOTE — TELEPHONE ENCOUNTER
Pt called into [facility], states that script that was sent to pharmacy for Symbicort Inhaler is very costly ($400.00and $150.00 with insurance) . Pt states that this is too much for her to pay out of pocket. Pt asked if there is a \"Pill\" form of this medication or something similar that she can take. Informed pt that message will be forwarded to clinical staff. Pt ok'd.      .Electronically signed by Cj Elise on 5/28/19 at 3:21 PM

## 2019-05-29 ENCOUNTER — TELEPHONE (OUTPATIENT)
Dept: INTERNAL MEDICINE | Age: 64
End: 2019-05-29

## 2019-05-29 NOTE — TELEPHONE ENCOUNTER
Spoke with Lindsey Juárez, we will attempt to get the medication covered under medical assistance and re-evaluate her at future appointment. I will follow up on this case during the day. Natalia Boo stated that she would call her.   Thank you!!

## 2019-06-04 ENCOUNTER — TELEPHONE (OUTPATIENT)
Dept: INTERNAL MEDICINE | Age: 64
End: 2019-06-04

## 2019-07-22 ENCOUNTER — OFFICE VISIT (OUTPATIENT)
Dept: INTERNAL MEDICINE | Age: 64
End: 2019-07-22
Payer: COMMERCIAL

## 2019-07-22 VITALS
BODY MASS INDEX: 32.58 KG/M2 | WEIGHT: 215 LBS | HEART RATE: 90 BPM | SYSTOLIC BLOOD PRESSURE: 133 MMHG | DIASTOLIC BLOOD PRESSURE: 88 MMHG | RESPIRATION RATE: 18 BRPM | HEIGHT: 68 IN | TEMPERATURE: 98.3 F

## 2019-07-22 DIAGNOSIS — E66.9 OBESITY (BMI 30.0-34.9): ICD-10-CM

## 2019-07-22 DIAGNOSIS — R05.3 CHRONIC COUGH: ICD-10-CM

## 2019-07-22 PROCEDURE — 99212 OFFICE O/P EST SF 10 MIN: CPT | Performed by: INTERNAL MEDICINE

## 2019-07-22 PROCEDURE — 99213 OFFICE O/P EST LOW 20 MIN: CPT | Performed by: INTERNAL MEDICINE

## 2019-07-22 RX ORDER — ALBUTEROL SULFATE 90 UG/1
2 AEROSOL, METERED RESPIRATORY (INHALATION) EVERY 6 HOURS PRN
Qty: 1 INHALER | Refills: 3 | Status: SHIPPED | OUTPATIENT
Start: 2019-07-22

## 2019-07-22 RX ORDER — BUDESONIDE AND FORMOTEROL FUMARATE DIHYDRATE 160; 4.5 UG/1; UG/1
2 AEROSOL RESPIRATORY (INHALATION) 2 TIMES DAILY
Qty: 1 INHALER | Refills: 5 | Status: SHIPPED | OUTPATIENT
Start: 2019-07-22

## 2019-07-22 RX ORDER — AZITHROMYCIN 250 MG/1
250 TABLET, FILM COATED ORAL SEE ADMIN INSTRUCTIONS
Qty: 6 TABLET | Refills: 0 | Status: SHIPPED | OUTPATIENT
Start: 2019-07-22 | End: 2019-07-27

## 2019-07-22 ASSESSMENT — ENCOUNTER SYMPTOMS
NAUSEA: 0
SINUS PAIN: 0
VOMITING: 0
SHORTNESS OF BREATH: 0
COUGH: 0
CONSTIPATION: 0
DIARRHEA: 0
WHEEZING: 0
ABDOMINAL PAIN: 0

## 2019-07-22 NOTE — PATIENT INSTRUCTIONS
Lab Tests to get prior to next Visit  1. None     Changes in Medications  1. Azithromycin   2. Symbicort Refilled     Referrals   1. None     Please follow up 3-6 months with our clinic. Please call if your symptoms worsen or fail to improve.

## 2019-07-22 NOTE — LETTER
[facility] Internal Medicine  [address] [place] [address]  Phone: [phone]  Fax: [phone] [facility] [address], DO        July 22, 2019     Patient: Phi Arriaga   YOB: 1955   Date of Visit: 7/22/2019       To Whom It May Concern: It is my medical opinion that Zahira Mcwilliams requires a disability parking placard for the following reasons:  She has limited walking ability due to an arthritic condition. Duration of need: 1 year    If you have any questions or concerns, please don't hesitate to call.     Sincerely,        Rajani Love, DO

## 2019-07-22 NOTE — PROGRESS NOTES
180 tablet 1    albuterol sulfate HFA (PROAIR HFA) 108 (90 Base) MCG/ACT inhaler Inhale 2 puffs into the lungs every 6 hours as needed for Wheezing 1 Inhaler 3       I have reviewed all pertinent PMHx, PSHx, FamHx, SocialHx, medications, and allergiesand updated history as appropriate. OBJECTIVE:    VS: /88 (Site: Left Upper Arm, Position: Sitting, Cuff Size: Medium Adult)   Pulse 90   Temp 98.3 °F (36.8 °C) (Oral)   Resp 18   Ht 5' 8\" (1.727 m)   Wt 215 lb (97.5 kg)   BMI 32.69 kg/m²   Physical Exam   Constitutional: She appears well-developed and well-nourished. HENT:   Head: Normocephalic and atraumatic. Right Ear: Hearing, tympanic membrane, external ear and ear canal normal. No mastoid tenderness. Tympanic membrane is not perforated, not erythematous and not bulging. Left Ear: Hearing, tympanic membrane, external ear and ear canal normal. No mastoid tenderness. Tympanic membrane is not perforated, not erythematous and not bulging. Nose: No mucosal edema. Right sinus exhibits no maxillary sinus tenderness and no frontal sinus tenderness. Left sinus exhibits no maxillary sinus tenderness and no frontal sinus tenderness. Mouth/Throat: Uvula is midline, oropharynx is clear and moist and mucous membranes are normal. No oropharyngeal exudate. Eyes: Pupils are equal, round, and reactive to light. Conjunctivae and EOM are normal. No scleral icterus. Neck: Carotid bruit is not present. Cardiovascular: Normal rate, regular rhythm, S1 normal, S2 normal and normal heart sounds. No murmur heard. Pulses:       Posterior tibial pulses are 2+ on the right side, and 2+ on the left side. No Edema in BL LE   Pulmonary/Chest: Effort normal. No respiratory distress. She has no decreased breath sounds. She has no wheezes. She has no rhonchi. She has no rales. Abdominal: Soft. Bowel sounds are normal. She exhibits no mass. There is no tenderness. There is no guarding.    Lymphadenopathy:     She has no cervical adenopathy. Neurological: She is alert. Nursing note and vitals reviewed. PLAN:    Chronic Cough  -productive; whitish clear sputum; discussed triggers; patient relates that notices that the cough gets worse when there   -PFTs; albuterol, symbicort to rule out cough variant asthma vs ASA/COXI induced asthma   -Protonix trial BID to R/O GERD 2/2 symptoms occurring at night when lying in bed more often   -Patient relates that her symptoms have improved since using the inhalers but states that she is concerned that she has an infectious process; no leukocytosis; no fevers; no night sweats no chills no enlarged lymph nodes postnasal drainage present  - Discussed with the patient to follow-up if her symptoms have not resolved with antibiotic or within a 5-month. For her chronic medical conditions and to reevaluate her for asthma/bronchitis. Patient relates that PFTs which were ordered at her last appointment were canceled due to her not showing up to the appointment. She states that she did not realize the appointment has been scheduled and was called by the automated service telling her that she did not show up. We discussed that if the azithromycin does not resolve her symptoms that she would consider rescheduling this appointment in the future.   - Azithromycin ordered for inflammatory changes    I have reviewed my findings and recommendations with Richardson Spurling and Dr Jim Huertas, DO PGY2  7/22/2019 2:28 PM

## 2019-07-22 NOTE — PROGRESS NOTES
Handicap parking letter given to pt   Medication change reviewed with patient  Advised pt to call for a fu appt in October or to call sooner if symptoms worsen or fail to improve  Understanding verbalized

## 2019-07-23 PROBLEM — R05.3 CHRONIC COUGH: Status: ACTIVE | Noted: 2019-07-23

## 2019-07-23 PROBLEM — E66.9 OBESITY (BMI 30.0-34.9): Status: ACTIVE | Noted: 2019-07-23

## 2019-08-01 ENCOUNTER — TELEPHONE (OUTPATIENT)
Dept: INTERNAL MEDICINE | Age: 64
End: 2019-08-01

## 2019-08-01 NOTE — TELEPHONE ENCOUNTER
Patient l/m 3:31. \"I need a refill on an antibiotic it goes to [facility] on [address]\". Azithromycin was prescribed on 7-22-19.   [facility] [phone]

## 2020-07-16 ENCOUNTER — TELEPHONE (OUTPATIENT)
Dept: INTERNAL MEDICINE | Age: 65
End: 2020-07-16

## 2020-07-16 NOTE — TELEPHONE ENCOUNTER
Patient left message for handicap [email or web address]  Spoke with Vivian Seymour, patient has not been seen since 7/22/19 and must reschedule appointment to address.   Message left for patient to call and reschedule appointment with Amita Rivera

## 2023-12-06 ENCOUNTER — APPOINTMENT (OUTPATIENT)
Dept: GENERAL RADIOLOGY | Age: 68
End: 2023-12-06
Payer: COMMERCIAL

## 2023-12-06 ENCOUNTER — APPOINTMENT (OUTPATIENT)
Dept: CT IMAGING | Age: 68
End: 2023-12-06
Payer: COMMERCIAL

## 2023-12-06 LAB
ALBUMIN SERPL-MCNC: 4.4 G/DL (ref 3.5–5.2)
ALP SERPL-CCNC: 88 U/L (ref 35–104)
ALT SERPL-CCNC: 22 U/L (ref 0–32)
ANION GAP SERPL CALCULATED.3IONS-SCNC: 13 MMOL/L (ref 7–16)
AST SERPL-CCNC: 24 U/L (ref 0–31)
BASOPHILS # BLD: 0.06 K/UL (ref 0–0.2)
BASOPHILS NFR BLD: 1 % (ref 0–2)
BILIRUB SERPL-MCNC: 0.2 MG/DL (ref 0–1.2)
BNP SERPL-MCNC: <36 PG/ML (ref 0–125)
BUN SERPL-MCNC: 14 MG/DL (ref 6–23)
CALCIUM SERPL-MCNC: 9.6 MG/DL (ref 8.6–10.2)
CHLORIDE SERPL-SCNC: 104 MMOL/L (ref 98–107)
CO2 SERPL-SCNC: 24 MMOL/L (ref 22–29)
CREAT SERPL-MCNC: 0.7 MG/DL (ref 0.5–1)
EOSINOPHIL # BLD: 0.52 K/UL (ref 0.05–0.5)
EOSINOPHILS RELATIVE PERCENT: 6 % (ref 0–6)
ERYTHROCYTE [DISTWIDTH] IN BLOOD BY AUTOMATED COUNT: 15.1 % (ref 11.5–15)
GFR SERPL CREATININE-BSD FRML MDRD: >60 ML/MIN/1.73M2
GLUCOSE SERPL-MCNC: 119 MG/DL (ref 74–99)
HCT VFR BLD AUTO: 38.9 % (ref 34–48)
HGB BLD-MCNC: 12.3 G/DL (ref 11.5–15.5)
IMM GRANULOCYTES # BLD AUTO: <0.03 K/UL (ref 0–0.58)
IMM GRANULOCYTES NFR BLD: 0 % (ref 0–5)
LYMPHOCYTES NFR BLD: 2.98 K/UL (ref 1.5–4)
LYMPHOCYTES RELATIVE PERCENT: 34 % (ref 20–42)
MAGNESIUM SERPL-MCNC: 2 MG/DL (ref 1.6–2.6)
MCH RBC QN AUTO: 28.3 PG (ref 26–35)
MCHC RBC AUTO-ENTMCNC: 31.6 G/DL (ref 32–34.5)
MCV RBC AUTO: 89.6 FL (ref 80–99.9)
MONOCYTES NFR BLD: 0.79 K/UL (ref 0.1–0.95)
MONOCYTES NFR BLD: 9 % (ref 2–12)
NEUTROPHILS NFR BLD: 50 % (ref 43–80)
NEUTS SEG NFR BLD: 4.39 K/UL (ref 1.8–7.3)
PLATELET # BLD AUTO: 304 K/UL (ref 130–450)
PMV BLD AUTO: 10.4 FL (ref 7–12)
POTASSIUM SERPL-SCNC: 3.9 MMOL/L (ref 3.5–5)
PROT SERPL-MCNC: 7.5 G/DL (ref 6.4–8.3)
RBC # BLD AUTO: 4.34 M/UL (ref 3.5–5.5)
SODIUM SERPL-SCNC: 141 MMOL/L (ref 132–146)
TROPONIN I SERPL HS-MCNC: 14 NG/L (ref 0–9)
WBC OTHER # BLD: 8.8 K/UL (ref 4.5–11.5)

## 2023-12-06 PROCEDURE — 93005 ELECTROCARDIOGRAM TRACING: CPT | Performed by: NURSE PRACTITIONER

## 2023-12-06 PROCEDURE — 70450 CT HEAD/BRAIN W/O DYE: CPT

## 2023-12-06 PROCEDURE — 99285 EMERGENCY DEPT VISIT HI MDM: CPT

## 2023-12-06 PROCEDURE — 83880 ASSAY OF NATRIURETIC PEPTIDE: CPT

## 2023-12-06 PROCEDURE — 6370000000 HC RX 637 (ALT 250 FOR IP): Performed by: NURSE PRACTITIONER

## 2023-12-06 PROCEDURE — 80053 COMPREHEN METABOLIC PANEL: CPT

## 2023-12-06 PROCEDURE — 71045 X-RAY EXAM CHEST 1 VIEW: CPT

## 2023-12-06 PROCEDURE — 83735 ASSAY OF MAGNESIUM: CPT

## 2023-12-06 PROCEDURE — 2580000003 HC RX 258: Performed by: NURSE PRACTITIONER

## 2023-12-06 PROCEDURE — 84484 ASSAY OF TROPONIN QUANT: CPT

## 2023-12-06 PROCEDURE — 85025 COMPLETE CBC W/AUTO DIFF WBC: CPT

## 2023-12-06 RX ORDER — MECLIZINE HCL 12.5 MG/1
25 TABLET ORAL ONCE
Status: COMPLETED | OUTPATIENT
Start: 2023-12-06 | End: 2023-12-06

## 2023-12-06 RX ORDER — ASPIRIN 81 MG/1
81 TABLET, CHEWABLE ORAL DAILY
COMMUNITY

## 2023-12-06 RX ORDER — 0.9 % SODIUM CHLORIDE 0.9 %
1000 INTRAVENOUS SOLUTION INTRAVENOUS ONCE
Status: COMPLETED | OUTPATIENT
Start: 2023-12-06 | End: 2023-12-07

## 2023-12-06 RX ADMIN — MECLIZINE 25 MG: 12.5 TABLET ORAL at 23:19

## 2023-12-06 RX ADMIN — SODIUM CHLORIDE 1000 ML: 9 INJECTION, SOLUTION INTRAVENOUS at 23:18

## 2023-12-06 ASSESSMENT — PAIN DESCRIPTION - LOCATION: LOCATION: BACK

## 2023-12-06 ASSESSMENT — PAIN DESCRIPTION - DESCRIPTORS: DESCRIPTORS: ACHING

## 2023-12-06 ASSESSMENT — PAIN - FUNCTIONAL ASSESSMENT: PAIN_FUNCTIONAL_ASSESSMENT: 0-10

## 2023-12-06 ASSESSMENT — PAIN SCALES - GENERAL: PAINLEVEL_OUTOF10: 7

## 2023-12-07 ENCOUNTER — HOSPITAL ENCOUNTER (EMERGENCY)
Age: 68
Discharge: HOME | End: 2023-12-07
Attending: EMERGENCY MEDICINE
Payer: COMMERCIAL

## 2023-12-07 VITALS
OXYGEN SATURATION: 95 % | HEART RATE: 76 BPM | DIASTOLIC BLOOD PRESSURE: 90 MMHG | SYSTOLIC BLOOD PRESSURE: 156 MMHG | TEMPERATURE: 97.5 F | RESPIRATION RATE: 18 BRPM

## 2023-12-07 DIAGNOSIS — R42 VERTIGO: Primary | ICD-10-CM

## 2023-12-07 DIAGNOSIS — I10 HYPERTENSION, UNSPECIFIED TYPE: ICD-10-CM

## 2023-12-07 LAB
EKG ATRIAL RATE: 84 BPM
EKG P AXIS: 51 DEGREES
EKG P-R INTERVAL: 180 MS
EKG Q-T INTERVAL: 384 MS
EKG QRS DURATION: 72 MS
EKG QTC CALCULATION (BAZETT): 453 MS
EKG R AXIS: 63 DEGREES
EKG T AXIS: 39 DEGREES
EKG VENTRICULAR RATE: 84 BPM
TROPONIN I SERPL HS-MCNC: 14 NG/L (ref 0–9)

## 2023-12-07 PROCEDURE — 84484 ASSAY OF TROPONIN QUANT: CPT

## 2023-12-07 PROCEDURE — 93010 ELECTROCARDIOGRAM REPORT: CPT | Performed by: INTERNAL MEDICINE

## 2023-12-07 NOTE — DISCHARGE INSTRUCTIONS
Please call in the morning to get an appointment to be seen by a primary care doctor to closely monitor your blood pressure. Return back to the emergency department if you develop any symptoms or concerns.

## 2023-12-07 NOTE — ED NOTES
Er Dr assessing pt, transport here t take pt to ct scan. Pt medicated as ordered.      Don Hidalgo, HUA  [id number]

## 2023-12-29 ENCOUNTER — OFFICE VISIT (OUTPATIENT)
Dept: INTERNAL MEDICINE | Age: 68
End: 2023-12-29
Payer: COMMERCIAL

## 2023-12-29 ENCOUNTER — HOSPITAL ENCOUNTER (OUTPATIENT)
Age: 68
Discharge: HOME | End: 2023-12-29
Payer: COMMERCIAL

## 2023-12-29 ENCOUNTER — TELEPHONE (OUTPATIENT)
Dept: INTERNAL MEDICINE | Age: 68
End: 2023-12-29

## 2023-12-29 VITALS
WEIGHT: 227.5 LBS | BODY MASS INDEX: 35.71 KG/M2 | HEART RATE: 84 BPM | SYSTOLIC BLOOD PRESSURE: 151 MMHG | OXYGEN SATURATION: 96 % | TEMPERATURE: 97.2 F | DIASTOLIC BLOOD PRESSURE: 100 MMHG | RESPIRATION RATE: 18 BRPM | HEIGHT: 67 IN

## 2023-12-29 DIAGNOSIS — I10 PRIMARY HYPERTENSION: ICD-10-CM

## 2023-12-29 DIAGNOSIS — E61.1 IRON DEFICIENCY: ICD-10-CM

## 2023-12-29 DIAGNOSIS — Z00.00 HEALTH CARE MAINTENANCE: ICD-10-CM

## 2023-12-29 DIAGNOSIS — R73.03 PREDIABETES: ICD-10-CM

## 2023-12-29 DIAGNOSIS — I10 PRIMARY HYPERTENSION: Primary | ICD-10-CM

## 2023-12-29 DIAGNOSIS — E66.9 OBESITY (BMI 30.0-34.9): ICD-10-CM

## 2023-12-29 LAB
25(OH)D3 SERPL-MCNC: 26.2 NG/ML (ref 30–100)
CHOLEST SERPL-MCNC: 200 MG/DL
HDLC SERPL-MCNC: 59 MG/DL
IRON SATN MFR SERPL: 28 % (ref 15–50)
IRON SERPL-MCNC: 76 UG/DL (ref 37–145)
LDLC SERPL CALC-MCNC: 130 MG/DL
TIBC SERPL-MCNC: 274 UG/DL (ref 250–450)
TRIGL SERPL-MCNC: 55 MG/DL
TSH SERPL DL<=0.05 MIU/L-ACNC: 1.26 UIU/ML (ref 0.27–4.2)
VLDLC SERPL CALC-MCNC: 11 MG/DL

## 2023-12-29 PROCEDURE — 99213 OFFICE O/P EST LOW 20 MIN: CPT | Performed by: INTERNAL MEDICINE

## 2023-12-29 PROCEDURE — 3080F DIAST BP >= 90 MM HG: CPT | Performed by: INTERNAL MEDICINE

## 2023-12-29 PROCEDURE — 36415 COLL VENOUS BLD VENIPUNCTURE: CPT

## 2023-12-29 PROCEDURE — 83540 ASSAY OF IRON: CPT

## 2023-12-29 PROCEDURE — 82306 VITAMIN D 25 HYDROXY: CPT

## 2023-12-29 PROCEDURE — 84443 ASSAY THYROID STIM HORMONE: CPT

## 2023-12-29 PROCEDURE — 80061 LIPID PANEL: CPT

## 2023-12-29 PROCEDURE — 83550 IRON BINDING TEST: CPT

## 2023-12-29 PROCEDURE — 3077F SYST BP >= 140 MM HG: CPT | Performed by: INTERNAL MEDICINE

## 2023-12-29 PROCEDURE — 1123F ACP DISCUSS/DSCN MKR DOCD: CPT | Performed by: INTERNAL MEDICINE

## 2023-12-29 PROCEDURE — 99212 OFFICE O/P EST SF 10 MIN: CPT

## 2023-12-29 NOTE — PATIENT INSTRUCTIONS
Dear Linn Parr,        Thank you for coming to your appointment today. I hope we have addressed all of your needs. Please make sure to do the following:  - Continue your medications as listed. - Please Get labs drawn before our next follow up. We will call you with the results     - We will see each other again 1/26/23 at 10:00 am.     Call for a sooner appointment if you have any concerns. Have a great day!         Sincerely,  Kindra Emerson MD  12/29/2023  11:21 AM g

## 2023-12-29 NOTE — TELEPHONE ENCOUNTER
Call to confirm client will be attending hypertension management class, client to check schedule and return call to this clinician.

## 2023-12-30 ENCOUNTER — HOSPITAL ENCOUNTER (EMERGENCY)
Age: 68
Discharge: HOME | End: 2023-12-31
Attending: EMERGENCY MEDICINE
Payer: COMMERCIAL

## 2023-12-30 ENCOUNTER — APPOINTMENT (OUTPATIENT)
Dept: GENERAL RADIOLOGY | Age: 68
End: 2023-12-30
Payer: COMMERCIAL

## 2023-12-30 DIAGNOSIS — I10 PRIMARY HYPERTENSION: Primary | ICD-10-CM

## 2023-12-30 LAB
BACTERIA URNS QL MICRO: ABNORMAL
BASOPHILS # BLD: 0.04 K/UL (ref 0–0.2)
BASOPHILS NFR BLD: 1 % (ref 0–2)
BILIRUB UR QL STRIP: NEGATIVE
CLARITY UR: CLEAR
COLOR UR: YELLOW
EOSINOPHIL # BLD: 0.39 K/UL (ref 0.05–0.5)
EOSINOPHILS RELATIVE PERCENT: 5 % (ref 0–6)
EPI CELLS #/AREA URNS HPF: ABNORMAL /HPF
ERYTHROCYTE [DISTWIDTH] IN BLOOD BY AUTOMATED COUNT: 14.9 % (ref 11.5–15)
GLUCOSE UR STRIP-MCNC: NEGATIVE MG/DL
HCT VFR BLD AUTO: 37.4 % (ref 34–48)
HGB BLD-MCNC: 12.2 G/DL (ref 11.5–15.5)
HGB UR QL STRIP.AUTO: NEGATIVE
IMM GRANULOCYTES # BLD AUTO: <0.03 K/UL (ref 0–0.58)
IMM GRANULOCYTES NFR BLD: 0 % (ref 0–5)
KETONES UR STRIP-MCNC: NEGATIVE MG/DL
LEUKOCYTE ESTERASE UR QL STRIP: ABNORMAL
LYMPHOCYTES NFR BLD: 2.75 K/UL (ref 1.5–4)
LYMPHOCYTES RELATIVE PERCENT: 36 % (ref 20–42)
MCH RBC QN AUTO: 28.4 PG (ref 26–35)
MCHC RBC AUTO-ENTMCNC: 32.6 G/DL (ref 32–34.5)
MCV RBC AUTO: 87 FL (ref 80–99.9)
MONOCYTES NFR BLD: 0.51 K/UL (ref 0.1–0.95)
MONOCYTES NFR BLD: 7 % (ref 2–12)
NEUTROPHILS NFR BLD: 51 % (ref 43–80)
NEUTS SEG NFR BLD: 3.87 K/UL (ref 1.8–7.3)
NITRITE UR QL STRIP: NEGATIVE
PH UR STRIP: 6 [PH] (ref 5–9)
PLATELET # BLD AUTO: 286 K/UL (ref 130–450)
PMV BLD AUTO: 9.5 FL (ref 7–12)
PROT UR STRIP-MCNC: NEGATIVE MG/DL
RBC # BLD AUTO: 4.3 M/UL (ref 3.5–5.5)
RBC #/AREA URNS HPF: ABNORMAL /HPF
SP GR UR STRIP: <1.005 (ref 1–1.03)
UROBILINOGEN UR STRIP-ACNC: 0.2 EU/DL (ref 0–1)
WBC #/AREA URNS HPF: ABNORMAL /HPF
WBC OTHER # BLD: 7.6 K/UL (ref 4.5–11.5)

## 2023-12-30 PROCEDURE — 93005 ELECTROCARDIOGRAM TRACING: CPT | Performed by: NURSE PRACTITIONER

## 2023-12-30 PROCEDURE — 99285 EMERGENCY DEPT VISIT HI MDM: CPT

## 2023-12-30 PROCEDURE — 81001 URINALYSIS AUTO W/SCOPE: CPT

## 2023-12-30 PROCEDURE — 84484 ASSAY OF TROPONIN QUANT: CPT

## 2023-12-30 PROCEDURE — 80053 COMPREHEN METABOLIC PANEL: CPT

## 2023-12-30 PROCEDURE — 71046 X-RAY EXAM CHEST 2 VIEWS: CPT

## 2023-12-30 PROCEDURE — 85025 COMPLETE CBC W/AUTO DIFF WBC: CPT

## 2023-12-30 RX ORDER — AMLODIPINE BESYLATE 5 MG/1
5 TABLET ORAL DAILY
Status: DISCONTINUED | OUTPATIENT
Start: 2023-12-31 | End: 2023-12-31

## 2023-12-30 ASSESSMENT — PAIN - FUNCTIONAL ASSESSMENT: PAIN_FUNCTIONAL_ASSESSMENT: NONE - DENIES PAIN

## 2023-12-31 ENCOUNTER — APPOINTMENT (OUTPATIENT)
Dept: CT IMAGING | Age: 68
End: 2023-12-31
Payer: COMMERCIAL

## 2023-12-31 VITALS
WEIGHT: 227 LBS | SYSTOLIC BLOOD PRESSURE: 166 MMHG | OXYGEN SATURATION: 98 % | TEMPERATURE: 98.2 F | HEIGHT: 67 IN | DIASTOLIC BLOOD PRESSURE: 82 MMHG | HEART RATE: 66 BPM | BODY MASS INDEX: 35.63 KG/M2 | RESPIRATION RATE: 18 BRPM

## 2023-12-31 LAB
ALBUMIN SERPL-MCNC: 4.7 G/DL (ref 3.5–5.2)
ALP SERPL-CCNC: 75 U/L (ref 35–104)
ALT SERPL-CCNC: 19 U/L (ref 0–32)
ANION GAP SERPL CALCULATED.3IONS-SCNC: 11 MMOL/L (ref 7–16)
AST SERPL-CCNC: 23 U/L (ref 0–31)
BILIRUB SERPL-MCNC: 0.2 MG/DL (ref 0–1.2)
BUN SERPL-MCNC: 14 MG/DL (ref 6–23)
CALCIUM SERPL-MCNC: 9.8 MG/DL (ref 8.6–10.2)
CHLORIDE SERPL-SCNC: 103 MMOL/L (ref 98–107)
CO2 SERPL-SCNC: 27 MMOL/L (ref 22–29)
CREAT SERPL-MCNC: 0.9 MG/DL (ref 0.5–1)
GFR SERPL CREATININE-BSD FRML MDRD: >60 ML/MIN/1.73M2
GLUCOSE SERPL-MCNC: 98 MG/DL (ref 74–99)
POTASSIUM SERPL-SCNC: 3.8 MMOL/L (ref 3.5–5)
PROT SERPL-MCNC: 8 G/DL (ref 6.4–8.3)
SODIUM SERPL-SCNC: 141 MMOL/L (ref 132–146)
TROPONIN I SERPL HS-MCNC: 12 NG/L (ref 0–9)
TROPONIN I SERPL HS-MCNC: 12 NG/L (ref 0–9)

## 2023-12-31 PROCEDURE — 84484 ASSAY OF TROPONIN QUANT: CPT

## 2023-12-31 PROCEDURE — 70450 CT HEAD/BRAIN W/O DYE: CPT

## 2023-12-31 PROCEDURE — 87086 URINE CULTURE/COLONY COUNT: CPT

## 2023-12-31 RX ORDER — AMLODIPINE BESYLATE 5 MG/1
5 TABLET ORAL DAILY
Status: DISCONTINUED | OUTPATIENT
Start: 2023-12-31 | End: 2023-12-31

## 2023-12-31 ASSESSMENT — PAIN - FUNCTIONAL ASSESSMENT: PAIN_FUNCTIONAL_ASSESSMENT: NONE - DENIES PAIN

## 2023-12-31 NOTE — ED PROVIDER NOTES
[facility] EMERGENCY DEPARTMENT  EMERGENCY DEPARTMENT ENCOUNTER        Pt Name: Elba Beck  MRN: [id number]  Birthdate 1955  Date of evaluation: 12/30/2023  Provider: Jennifer Leija DO  PCP: Aby Griffin MD  Note Started: 1:24 AM EST 12/31/23    CHIEF COMPLAINT       Chief Complaint   Patient presents with    Hypertension     Elevated BP today, states felt jittery, denies headache or chest pain         HISTORY OF PRESENT ILLNESS: 1 or more Elements        Limitations to history : None    Elba Beck is a 68 y.o. female who presents for evaluation of hypertension.  She states that earlier today she started to feel jittery and took her blood pressure.  She found it to be over 200 systolic.  She was just recently seen by her PCP and started on amlodipine secondary to newly diagnosed hypertension.  She denies chest pain.  She does have a mild headache.  Denies vision changes or focal neurological deficits.  Denies any urinary symptoms.  Today was her first dose of amlodipine.      Nursing Notes were all reviewed and agreed with or any disagreements were addressed in the HPI.      REVIEW OF EXTERNAL NOTE :       Reviewed previous office visit on 12/29/2023 with internal medicine as well as 12/21/2023 with internal medicine      Chart Review/External Note Review    Last Echo reviewed by Me:  No results found for: \"LVEF\", \"LVEFMODE\"          Controlled Substance Monitoring:    Acute and Chronic Pain Monitoring:        No data to display                    REVIEW OF SYSTEMS :      Positives and Pertinent negatives as per HPI.     SURGICAL HISTORY     Past Surgical History:   Procedure Laterality Date    BACK SURGERY      disc, lower back       CURRENTMEDICATIONS       Previous Medications    AMLODIPINE (NORVASC) 5 MG TABLET    Take 1 tablet by mouth daily    ASPIRIN 81 MG CHEWABLE TABLET    Take 1 tablet by mouth daily    BENZONATATE (TESSALON) 100 MG CAPSULE    Take

## 2024-01-01 LAB
MICROORGANISM SPEC CULT: ABNORMAL
SPECIMEN DESCRIPTION: ABNORMAL

## 2024-01-02 ENCOUNTER — TELEPHONE (OUTPATIENT)
Dept: INTERNAL MEDICINE | Age: 69
End: 2024-01-02

## 2024-01-02 DIAGNOSIS — Z12.31 ENCOUNTER FOR SCREENING MAMMOGRAM FOR MALIGNANT NEOPLASM OF BREAST: Primary | ICD-10-CM

## 2024-01-02 LAB
EKG ATRIAL RATE: 71 BPM
EKG P AXIS: 34 DEGREES
EKG P-R INTERVAL: 178 MS
EKG Q-T INTERVAL: 382 MS
EKG QRS DURATION: 80 MS
EKG QTC CALCULATION (BAZETT): 415 MS
EKG R AXIS: 25 DEGREES
EKG T AXIS: 20 DEGREES
EKG VENTRICULAR RATE: 71 BPM

## 2024-01-02 PROCEDURE — 93010 ELECTROCARDIOGRAM REPORT: CPT | Performed by: INTERNAL MEDICINE

## 2024-01-02 NOTE — TELEPHONE ENCOUNTER
Please change diagnosis code for mammogram order. Per [facility] [employer]. This office states insurance is rejecting mammogram.

## 2024-01-03 ENCOUNTER — TELEPHONE (OUTPATIENT)
Dept: INTERNAL MEDICINE | Age: 69
End: 2024-01-03

## 2024-01-22 ENCOUNTER — TELEPHONE (OUTPATIENT)
Dept: INTERNAL MEDICINE | Age: 69
End: 2024-01-22

## 2024-01-22 NOTE — TELEPHONE ENCOUNTER
Message left on voicemail asking patient to call back to discuss lab results.  Nidia Miramontes LPN

## 2024-01-22 NOTE — TELEPHONE ENCOUNTER
----- Message from Aby Griffin MD sent at 1/22/2024  3:37 PM EST -----  Called patient, to discuss about her lab results. Plan was to start moderate dose statin with Lipitor 40 mg as ASCVD score 18%, , hx of HTN, also plan is to start Vitamin D 1000 U daily for Vitamin D deficiency.

## 2024-02-07 ENCOUNTER — TELEPHONE (OUTPATIENT)
Dept: INTERNAL MEDICINE | Age: 69
End: 2024-02-07

## 2024-02-07 NOTE — TELEPHONE ENCOUNTER
Follow up call to see how self-monitoring is going after hypertension management class last week. No answer to phone. Voice mail message left requesting patient return call to this clinician.

## 2024-02-07 NOTE — TELEPHONE ENCOUNTER
Patient called to discuss lab results. Message left for patient on 1/22/24. Please see telephone encounter from 1-22-24.

## 2024-05-06 ENCOUNTER — HOSPITAL ENCOUNTER (OUTPATIENT)
Age: 69
Discharge: HOME | End: 2024-05-08
Payer: COMMERCIAL

## 2024-05-06 ENCOUNTER — HOSPITAL ENCOUNTER (OUTPATIENT)
Dept: GENERAL RADIOLOGY | Age: 69
Discharge: HOME | End: 2024-05-08
Payer: COMMERCIAL

## 2024-05-06 ENCOUNTER — OFFICE VISIT (OUTPATIENT)
Dept: INTERNAL MEDICINE | Age: 69
End: 2024-05-06
Payer: COMMERCIAL

## 2024-05-06 VITALS
TEMPERATURE: 97.9 F | BODY MASS INDEX: 33.9 KG/M2 | HEART RATE: 85 BPM | WEIGHT: 216 LBS | SYSTOLIC BLOOD PRESSURE: 128 MMHG | RESPIRATION RATE: 18 BRPM | HEIGHT: 67 IN | OXYGEN SATURATION: 98 % | DIASTOLIC BLOOD PRESSURE: 88 MMHG

## 2024-05-06 DIAGNOSIS — G89.29 CHRONIC LOW BACK PAIN WITHOUT SCIATICA, UNSPECIFIED BACK PAIN LATERALITY: ICD-10-CM

## 2024-05-06 DIAGNOSIS — K59.00 CONSTIPATION, UNSPECIFIED CONSTIPATION TYPE: ICD-10-CM

## 2024-05-06 DIAGNOSIS — R09.81 SINUS CONGESTION: ICD-10-CM

## 2024-05-06 DIAGNOSIS — M54.50 CHRONIC LOW BACK PAIN WITHOUT SCIATICA, UNSPECIFIED BACK PAIN LATERALITY: ICD-10-CM

## 2024-05-06 DIAGNOSIS — R05.1 ACUTE COUGH: Primary | ICD-10-CM

## 2024-05-06 DIAGNOSIS — E78.2 MIXED HYPERLIPIDEMIA: ICD-10-CM

## 2024-05-06 DIAGNOSIS — I15.9 SECONDARY HYPERTENSION: ICD-10-CM

## 2024-05-06 LAB
INFLUENZA A ANTIGEN, POC: NEGATIVE
INFLUENZA B ANTIGEN, POC: NEGATIVE
LOT EXPIRE DATE: NORMAL
LOT KIT NUMBER: NORMAL
S PYO AG THROAT QL: NORMAL
SARS-COV-2, POC: NORMAL
VALID INTERNAL CONTROL: NORMAL
VENDOR AND KIT NAME POC: NORMAL

## 2024-05-06 PROCEDURE — 87428 SARSCOV & INF VIR A&B AG IA: CPT

## 2024-05-06 PROCEDURE — 3079F DIAST BP 80-89 MM HG: CPT

## 2024-05-06 PROCEDURE — 72100 X-RAY EXAM L-S SPINE 2/3 VWS: CPT

## 2024-05-06 PROCEDURE — 99213 OFFICE O/P EST LOW 20 MIN: CPT

## 2024-05-06 PROCEDURE — 87880 STREP A ASSAY W/OPTIC: CPT

## 2024-05-06 PROCEDURE — 1123F ACP DISCUSS/DSCN MKR DOCD: CPT

## 2024-05-06 PROCEDURE — 3074F SYST BP LT 130 MM HG: CPT

## 2024-05-06 RX ORDER — FLUTICASONE PROPIONATE 50 MCG
2 SPRAY, SUSPENSION (ML) NASAL DAILY
Qty: 16 G | Refills: 7 | Status: SHIPPED | OUTPATIENT
Start: 2024-05-06

## 2024-05-06 RX ORDER — AMLODIPINE BESYLATE 5 MG/1
5 TABLET ORAL DAILY
Qty: 30 TABLET | Refills: 7 | Status: SHIPPED | OUTPATIENT
Start: 2024-05-06

## 2024-05-06 RX ORDER — ATORVASTATIN CALCIUM 40 MG/1
40 TABLET, FILM COATED ORAL DAILY
Qty: 30 TABLET | Refills: 2 | Status: SHIPPED | OUTPATIENT
Start: 2024-05-06

## 2024-05-06 RX ORDER — DOCUSATE SODIUM 100 MG/1
100 CAPSULE, LIQUID FILLED ORAL 2 TIMES DAILY
Qty: 60 CAPSULE | Refills: 0 | Status: SHIPPED | OUTPATIENT
Start: 2024-05-06 | End: 2024-06-05

## 2024-05-06 RX ORDER — BIOTIN 10 MG
1 TABLET ORAL DAILY
COMMUNITY

## 2024-05-06 ASSESSMENT — ENCOUNTER SYMPTOMS
RHINORRHEA: 0
BACK PAIN: 0
SHORTNESS OF BREATH: 0
ABDOMINAL PAIN: 0
EYE DISCHARGE: 0
CHEST TIGHTNESS: 0
PHOTOPHOBIA: 0
COUGH: 0
EYE ITCHING: 0
DIARRHEA: 0
SORE THROAT: 0
CONSTIPATION: 0

## 2024-05-06 ASSESSMENT — PATIENT HEALTH QUESTIONNAIRE - PHQ9
SUM OF ALL RESPONSES TO PHQ QUESTIONS 1-9: 0
2. FEELING DOWN, DEPRESSED OR HOPELESS: NOT AT ALL
SUM OF ALL RESPONSES TO PHQ QUESTIONS 1-9: 0
SUM OF ALL RESPONSES TO PHQ9 QUESTIONS 1 & 2: 0
SUM OF ALL RESPONSES TO PHQ QUESTIONS 1-9: 0
SUM OF ALL RESPONSES TO PHQ QUESTIONS 1-9: 0
1. LITTLE INTEREST OR PLEASURE IN DOING THINGS: NOT AT ALL

## 2024-05-06 NOTE — PATIENT INSTRUCTIONS
Dear Elba Beck,    Thank you for coming to your appointment today. I hope we have addressed all of your needs.       Please make sure to do the following:  - Continue your medications as listed.  - Please do X-ray of Lumbar spine  - We will see each other again in 3 months    Call for a sooner appointment if you develop any chest pain, shortness of breath, leg swelling, headache, vision changes.    Have a great day!    Sincerely,  Aby Griffin MD  5/6/2024  9:14 AM

## 2024-05-06 NOTE — PROGRESS NOTES
[facility]  Internal Medicine Residency Clinic    Attending Physician Statement  I have discussed the case, including pertinent history and exam findings with the resident physician.  I agree with the assessment, plan and orders as documented by the resident. I have reviewed all pertinent PMHx, PSHx, FamHx, SocialHx, medications, and allergies and updated history as appropriate.    Patient here for routine follow up of medical problems.   Complains of cough and scratchy throat.  Non-productive cough.    HTN - Ambulatory BPs are good on amlodipine, 5 mg daily.    Continue same    Low back pain with sciatica - Now with pain down her R leg.     Check X-ray as previously ordered.       Hyperlipidemia -   The 10-year ASCVD risk score (Maria Luz DK, et al., 2019) is: 10.8%    Values used to calculate the score:      Age: 68 years      Sex: Female      Is Non- : Yes      Diabetic: No      Tobacco smoker: No      Systolic Blood Pressure: 128 mmHg      Is BP treated: Yes      HDL Cholesterol: 59 mg/dL      Total Cholesterol: 200 mg/dL  Risk score is elevated with HTN.  Will start statin if patient is agreeable.     Remainder of medical problems as per resident note.    Lynne Prado MD  5/6/2024 9:05 AM      
Great grandson lost paper of blood pressure readings. However, most recent reading was 131/88 with pulse of 79. Nidia Miramontes LPN    Checked patient's machine and BP was 135/91  Nidia Miramontes LPN    Batteries changed, /80. Nidia Miramontes LPN    
difficulty urinating, frequency and urgency.   Musculoskeletal:  Negative for arthralgias, back pain and myalgias.   Skin:  Negative for pallor and rash.   Neurological:  Negative for dizziness, speech difficulty, weakness and light-headedness.   Hematological:  Negative for adenopathy. Does not bruise/bleed easily.   Psychiatric/Behavioral:  Negative for agitation, dysphoric mood, self-injury and sleep disturbance. The patient is not nervous/anxious.      Current Outpatient Medications on File Prior to Visit   Medication Sig Dispense Refill    Cyanocobalamin (VITAMIN B-12) 3000 MCG SUBL Place 1 tablet under the tongue daily OTC      vitamin D (CHOLECALCIFEROL) 25 MCG (1000 UT) TABS tablet Take 1 tablet by mouth daily OTC      Menaquinone-7 (VITAMIN K2 PO) Take 120 mcg by mouth daily OTC      Multiple Vitamins-Minerals (MULTIVITAMIN WOMEN 50+) TABS Take 1 tablet by mouth daily OTC      Misc. Devices KIT BP cuff - may substitute brand for insurance coverage. 1 kit 0    aspirin 81 MG chewable tablet Take 1 tablet by mouth daily OTC       No current facility-administered medications on file prior to visit.     OBJECTIVE:    VS:   Vitals:    05/06/24 0800   BP: 128/88   Site: Left Upper Arm   Position: Sitting   Cuff Size: Large Adult   Pulse: 85   Resp: 18   Temp: 97.9 °F (36.6 °C)   TempSrc: Temporal   SpO2: 98%   Weight: 98 kg (216 lb)   Height: 1.702 m (5' 7\")     Physical Exam  Constitutional:       General: She is not in acute distress.     Appearance: Normal appearance. She is obese. She is not ill-appearing.   HENT:      Head: Normocephalic and atraumatic.      Mouth/Throat:      Pharynx: No oropharyngeal exudate or posterior oropharyngeal erythema.   Eyes:      General: No scleral icterus.     Extraocular Movements: Extraocular movements intact.      Conjunctiva/sclera: Conjunctivae normal.   Cardiovascular:      Rate and Rhythm: Normal rate and regular rhythm.      Pulses: Normal pulses.      Heart sounds:

## 2024-05-14 ENCOUNTER — TELEPHONE (OUTPATIENT)
Dept: INTERNAL MEDICINE | Age: 69
End: 2024-05-14

## 2024-05-14 NOTE — TELEPHONE ENCOUNTER
Called patient with her report of X-ray lumbar spine, which showed anterolisthesis of L4-L5 vertebrae, with some degenerative changes.  Conservative management options have been discussed with patient, She mentioned, she is feeling fine currently, does not need any pain medications for now, and Physical therapy referral she wanted to hold for now. In Our next office visit, we will discuss further, advised any worsening back pain, numbness, tingling, or bowel and bladder incontinence, call our clinic as soon as possible.     Electronically signed by Aby Griffin MD on 5/14/2024 at 4:07 PM

## 2024-05-16 ENCOUNTER — OFFICE VISIT (OUTPATIENT)
Dept: SURGERY | Age: 69
End: 2024-05-16
Payer: COMMERCIAL

## 2024-05-16 VITALS
OXYGEN SATURATION: 95 % | HEIGHT: 67 IN | BODY MASS INDEX: 33.74 KG/M2 | TEMPERATURE: 97.2 F | RESPIRATION RATE: 16 BRPM | SYSTOLIC BLOOD PRESSURE: 122 MMHG | WEIGHT: 215 LBS | HEART RATE: 92 BPM | DIASTOLIC BLOOD PRESSURE: 76 MMHG

## 2024-05-16 DIAGNOSIS — K59.01 SLOW TRANSIT CONSTIPATION: Primary | ICD-10-CM

## 2024-05-16 PROCEDURE — 99204 OFFICE O/P NEW MOD 45 MIN: CPT | Performed by: SURGERY

## 2024-05-16 PROCEDURE — 99202 OFFICE O/P NEW SF 15 MIN: CPT | Performed by: SURGERY

## 2024-05-16 PROCEDURE — 3074F SYST BP LT 130 MM HG: CPT | Performed by: SURGERY

## 2024-05-16 PROCEDURE — 3078F DIAST BP <80 MM HG: CPT | Performed by: SURGERY

## 2024-05-16 PROCEDURE — 1123F ACP DISCUSS/DSCN MKR DOCD: CPT | Performed by: SURGERY

## 2024-05-16 NOTE — PROGRESS NOTES
Activity: Not on file   Stress: Not on file   Social Connections: Not on file   Intimate Partner Violence: Not on file   Housing Stability: Unknown (12/21/2023)    Housing Stability Vital Sign     Unable to Pay for Housing in the Last Year: Not on file     Number of Places Lived in the Last Year: Not on file     Unstable Housing in the Last Year: No     Allergies   Allergen Reactions    Vicodin [Hydrocodone-Acetaminophen]         I have reviewed and confirmed the past medical history, surgical history, social history, allergies in the chart.    Medications: I have reviewed the medication list in the chart.    Review of Systems:  Pertinent positives noted in HPI, otherwise negative    Physical Exam   /76 (Site: Left Upper Arm, Position: Sitting, Cuff Size: Large Adult)   Pulse 92   Temp 97.2 °F (36.2 °C) (Infrared)   Resp 16   Ht 1.702 m (5' 7\")   Wt 97.5 kg (215 lb)   SpO2 95%   BMI 33.67 kg/m²   Vitals:    05/16/24 1241   BP: 122/76   Pulse: 92   Resp: 16   Temp: 97.2 °F (36.2 °C)   SpO2: 95%       PSYCH: mood and affect normal, alert and oriented x 3  CONSTITUTIONAL: No apparent distress, comfortable  EYES: Sclera white, pupils equal round and reactive to light  ENMT:  Hearing normal, trachea midline, ears externally intact  RESP: Breath sounds were clear and equal              Respiratory effort was normal   CV: Heart sounds were normal with a regular rate and rhythm.   No pedal edema  GI/ Abdomen: The abdomen was soft and non distended.                     There was no tenderness, guarding, rebound, or rigidity.    Rectal -Deferred  MSK: no clubbing/ no cyanosis/ gait normal       Assessment:    Constipation  Average risk for colorectal cancer     Plan:  Constipation  Recommending increasing fiber in her diet  Pt wants to try and eat better.  She states that she wants to start visiting farmer's markets  Dietary foods high in fiber provided to the patient.      Screening colonoscopy  I discussed the

## 2024-05-16 NOTE — PATIENT INSTRUCTIONS
kidney beans, lentils, lima beans, split peas, and rene beans All nuts and seeds, especially almonds, peanuts, Brazil nuts, cashews, peanut butter, walnuts, sesame and sunflower seeds All meat, poultry, fish, and eggs   Increase fiber in meat dishes by adding rene beans, kidney beans, black-eyed peas, bran, or oatmeal. If you are following a low-fat diet, use nuts and seeds only in moderation.   Fats and Oils   All in moderation   Fats and oils do not provide fiber   Snacks, Sweets, and Condiments   Fruit Nuts Popcorn, whole-wheat pretzels, or trail mix made with dried fruits, nuts, and seeds Cakes, breads, and cookies made with oatmeal or whole-wheat flour   Most snack foods do not provide much fiber. Choose snacks with at least 2 grams of fiber per serving.   Last Reviewed: March 2011 Jeimy Rey MS, MPH, RD   Updated: 3/29/2011

## 2024-05-20 ENCOUNTER — TELEPHONE (OUTPATIENT)
Dept: INTERNAL MEDICINE | Age: 69
End: 2024-05-20

## 2024-05-20 NOTE — TELEPHONE ENCOUNTER
----- Message from Nidia Miramontes LPN sent at 5/6/2024  9:39 AM EDT -----  3 month follow up Dr. Griffin 8/6/24 5/20/24 1453 Appointment made with Dr. Griffin on 8/20/24 at 1300. Declined reminder card to be mailed to home. Nidia Miramontes LPN

## 2024-08-20 ENCOUNTER — OFFICE VISIT (OUTPATIENT)
Dept: INTERNAL MEDICINE | Age: 69
End: 2024-08-20
Payer: COMMERCIAL

## 2024-08-20 VITALS
HEART RATE: 98 BPM | DIASTOLIC BLOOD PRESSURE: 89 MMHG | HEIGHT: 67 IN | OXYGEN SATURATION: 98 % | SYSTOLIC BLOOD PRESSURE: 124 MMHG | WEIGHT: 210 LBS | RESPIRATION RATE: 18 BRPM | BODY MASS INDEX: 32.96 KG/M2 | TEMPERATURE: 98.5 F

## 2024-08-20 DIAGNOSIS — E55.9 VITAMIN D DEFICIENCY: ICD-10-CM

## 2024-08-20 DIAGNOSIS — E53.8 VITAMIN B12 DEFICIENCY: ICD-10-CM

## 2024-08-20 DIAGNOSIS — I10 PRIMARY HYPERTENSION: Primary | ICD-10-CM

## 2024-08-20 DIAGNOSIS — E78.2 MIXED HYPERLIPIDEMIA: ICD-10-CM

## 2024-08-20 PROCEDURE — 3074F SYST BP LT 130 MM HG: CPT

## 2024-08-20 PROCEDURE — 3079F DIAST BP 80-89 MM HG: CPT

## 2024-08-20 PROCEDURE — 99213 OFFICE O/P EST LOW 20 MIN: CPT

## 2024-08-20 PROCEDURE — 99211 OFF/OP EST MAY X REQ PHY/QHP: CPT

## 2024-08-20 PROCEDURE — 1123F ACP DISCUSS/DSCN MKR DOCD: CPT

## 2024-08-20 RX ORDER — ATORVASTATIN CALCIUM 40 MG/1
40 TABLET, FILM COATED ORAL DAILY
Qty: 30 TABLET | Refills: 2 | Status: SHIPPED | OUTPATIENT
Start: 2024-08-20

## 2024-08-21 ENCOUNTER — HOSPITAL ENCOUNTER (OUTPATIENT)
Age: 69
Discharge: HOME | End: 2024-08-21
Payer: COMMERCIAL

## 2024-08-21 DIAGNOSIS — E53.8 VITAMIN B12 DEFICIENCY: ICD-10-CM

## 2024-08-21 DIAGNOSIS — E78.2 MIXED HYPERLIPIDEMIA: ICD-10-CM

## 2024-08-21 DIAGNOSIS — E55.9 VITAMIN D DEFICIENCY: ICD-10-CM

## 2024-08-21 LAB
25(OH)D3 SERPL-MCNC: 32.2 NG/ML (ref 30–100)
CHOLEST SERPL-MCNC: 220 MG/DL
HDLC SERPL-MCNC: 65 MG/DL
LDLC SERPL CALC-MCNC: 139 MG/DL
TRIGL SERPL-MCNC: 79 MG/DL
VLDLC SERPL CALC-MCNC: 16 MG/DL

## 2024-08-21 PROCEDURE — 82746 ASSAY OF FOLIC ACID SERUM: CPT

## 2024-08-21 PROCEDURE — 82607 VITAMIN B-12: CPT

## 2024-08-21 PROCEDURE — 82306 VITAMIN D 25 HYDROXY: CPT

## 2024-08-21 PROCEDURE — 80061 LIPID PANEL: CPT

## 2024-08-21 PROCEDURE — 36415 COLL VENOUS BLD VENIPUNCTURE: CPT

## 2024-08-22 LAB
FOLATE SERPL-MCNC: 19.8 NG/ML (ref 4.8–24.2)
VIT B12 SERPL-MCNC: >2000 PG/ML (ref 211–946)